# Patient Record
Sex: MALE | Race: BLACK OR AFRICAN AMERICAN | NOT HISPANIC OR LATINO | URBAN - METROPOLITAN AREA
[De-identification: names, ages, dates, MRNs, and addresses within clinical notes are randomized per-mention and may not be internally consistent; named-entity substitution may affect disease eponyms.]

---

## 2020-12-18 ENCOUNTER — EMERGENCY (EMERGENCY)
Facility: HOSPITAL | Age: 55
LOS: 1 days | Discharge: HOME | End: 2020-12-18
Admitting: EMERGENCY MEDICINE
Payer: COMMERCIAL

## 2020-12-18 VITALS
SYSTOLIC BLOOD PRESSURE: 161 MMHG | HEART RATE: 48 BPM | DIASTOLIC BLOOD PRESSURE: 94 MMHG | OXYGEN SATURATION: 98 % | RESPIRATION RATE: 16 BRPM | HEIGHT: 73 IN | WEIGHT: 225.09 LBS | TEMPERATURE: 98 F

## 2020-12-18 DIAGNOSIS — Z91.013 ALLERGY TO SEAFOOD: ICD-10-CM

## 2020-12-18 DIAGNOSIS — M79.89 OTHER SPECIFIED SOFT TISSUE DISORDERS: ICD-10-CM

## 2020-12-18 DIAGNOSIS — M79.605 PAIN IN LEFT LEG: ICD-10-CM

## 2020-12-18 PROCEDURE — 93971 EXTREMITY STUDY: CPT | Mod: 26,LT

## 2020-12-18 PROCEDURE — 99284 EMERGENCY DEPT VISIT MOD MDM: CPT

## 2020-12-18 RX ADMIN — Medication 100 MILLIGRAM(S): at 20:22

## 2020-12-18 NOTE — ED PROVIDER NOTE - OBJECTIVE STATEMENT
56 yo male with hx HTN presents c/o swelling to left leg that started 2 weeks ago. states he injured his left leg 2 weeks ago when he bumped it against the stairs going up the stairs, did not fall but sustained a scrape to the area. no bleeding. states pain has improved and swelling improved, however area shin is still swollen down to the left ankle. denies fever/chills or vomiting. ambulatory. works as security. had xrays of left shin/ankle few weeks ago that were negative. sent by PMD to r/o DVT.

## 2020-12-18 NOTE — ED PROVIDER NOTE - PATIENT PORTAL LINK FT
You can access the FollowMyHealth Patient Portal offered by [facility] by registering at the following website: [email or web address]. By joining [facility]’s FollowMyHealth portal, you will also be able to view your health information using other applications (apps) compatible with our system.

## 2020-12-18 NOTE — ED PROVIDER NOTE - NSFOLLOWUPINSTRUCTIONS_ED_ALL_ED_FT
Take antibiotics as prescribed  Leg elevation.   Take Ibuprofen 600mg every 6-8 hours as needed for pain, take with food, and in addition you may take Tylenol 500 mg every 6-8 hours as needed for pain  Rest. Cool compresses.  Extremity elevation.     Follow up with primary care provider within 2-3 days for re-evaluation    RETURN TO THE EMERGENCY DEPARTMENT FOR WORSENING PAIN, SWELLING, REDNESS, FEVER, OR ANY CONCERNS.

## 2020-12-18 NOTE — ED PROVIDER NOTE - PHYSICAL EXAMINATION
CONSTITUTIONAL: Well-appearing; well-nourished; in no apparent distress.   	HEAD: Normocephalic; atraumatic.   	EYES:  clear  	ENT: airway patent  	NECK: Supple; non-tender;   	CARDIOVASCULAR: Normal S1, S2; no murmurs, rubs, or gallops. Regular rate and rhythm.   	RESPIRATORY: Breathing easily; breath sounds clear and equal bilaterally; no wheezes, rhonchi, or rales.  	GI: Soft; non-distended; non-tender  	LLE: mild-moderate swelling from shin to ankle. nontender calf. no tenderness to leg. no erythema. normal temperature and color. distal pulses intact. soft compartments.   	SKIN: Normal for age and race; warm; dry; good turgor; no apparent lesions or rash.   	NEURO: A & O x 3; face symmetric; grossly unremarkable.   PSYCHOLOGICAL: The patient’s mood and manner are appropriate.

## 2020-12-18 NOTE — ED PROVIDER NOTE - CLINICAL SUMMARY MEDICAL DECISION MAKING FREE TEXT BOX
LLE swelling x 2 weeks ago, sustained contusion 2 weeks ago, had neg xrays for fracture already at urgent care, nvi, ambulatory, sent by PMD to for US r/o DVT. LLE swelling x 2 weeks ago, sustained contusion 2 weeks ago, had neg xrays for fracture already at urgent care, nvi, ambulatory, sent by PMD to for US r/o DVT. US prelim neg for DVT. will rx doxycycline for possible cellulitis, well appearing, NAD. afebrile, advised leg elevation, f/u primary care, return precautions discussed.

## 2020-12-18 NOTE — ED ADULT NURSE NOTE - NSIMPLEMENTINTERV_GEN_ALL_ED
Implemented All Universal Safety Interventions:  Home to call system. Call bell, personal items and telephone within reach. Instruct patient to call for assistance. Room bathroom lighting operational. Non-slip footwear when patient is off stretcher. Physically safe environment: no spills, clutter or unnecessary equipment. Stretcher in lowest position, wheels locked, appropriate side rails in place.